# Patient Record
Sex: FEMALE | Race: WHITE
[De-identification: names, ages, dates, MRNs, and addresses within clinical notes are randomized per-mention and may not be internally consistent; named-entity substitution may affect disease eponyms.]

---

## 2018-01-02 ENCOUNTER — HOSPITAL ENCOUNTER (EMERGENCY)
Dept: HOSPITAL 17 - NEPE | Age: 23
LOS: 1 days | Discharge: HOME | End: 2018-01-03
Payer: COMMERCIAL

## 2018-01-02 VITALS
DIASTOLIC BLOOD PRESSURE: 85 MMHG | HEART RATE: 116 BPM | RESPIRATION RATE: 16 BRPM | TEMPERATURE: 98.7 F | SYSTOLIC BLOOD PRESSURE: 127 MMHG | OXYGEN SATURATION: 100 %

## 2018-01-02 VITALS — HEIGHT: 62 IN | BODY MASS INDEX: 20.28 KG/M2 | WEIGHT: 110.23 LBS

## 2018-01-02 DIAGNOSIS — L30.9: Primary | ICD-10-CM

## 2018-01-02 PROCEDURE — 87040 BLOOD CULTURE FOR BACTERIA: CPT

## 2018-01-02 PROCEDURE — 85025 COMPLETE CBC W/AUTO DIFF WBC: CPT

## 2018-01-02 PROCEDURE — 96375 TX/PRO/DX INJ NEW DRUG ADDON: CPT

## 2018-01-02 PROCEDURE — 99284 EMERGENCY DEPT VISIT MOD MDM: CPT

## 2018-01-02 PROCEDURE — 96374 THER/PROPH/DIAG INJ IV PUSH: CPT

## 2018-01-03 LAB
BASOPHILS # BLD AUTO: 0.1 TH/MM3 (ref 0–0.2)
BASOPHILS NFR BLD: 0.5 % (ref 0–2)
EOSINOPHIL # BLD: 0.1 TH/MM3 (ref 0–0.4)
EOSINOPHIL NFR BLD: 0.7 % (ref 0–4)
ERYTHROCYTE [DISTWIDTH] IN BLOOD BY AUTOMATED COUNT: 13.1 % (ref 11.6–17.2)
HCT VFR BLD CALC: 38.1 % (ref 35–46)
HGB BLD-MCNC: 13.1 GM/DL (ref 11.6–15.3)
LYMPHOCYTES # BLD AUTO: 2 TH/MM3 (ref 1–4.8)
LYMPHOCYTES NFR BLD AUTO: 18 % (ref 9–44)
MCH RBC QN AUTO: 30.7 PG (ref 27–34)
MCHC RBC AUTO-ENTMCNC: 34.3 % (ref 32–36)
MCV RBC AUTO: 89.6 FL (ref 80–100)
MONOCYTE #: 0.9 TH/MM3 (ref 0–0.9)
MONOCYTES NFR BLD: 8.5 % (ref 0–8)
NEUTROPHILS # BLD AUTO: 8 TH/MM3 (ref 1.8–7.7)
NEUTROPHILS NFR BLD AUTO: 72.3 % (ref 16–70)
PLATELET # BLD: 251 TH/MM3 (ref 150–450)
PMV BLD AUTO: 8.6 FL (ref 7–11)
RBC # BLD AUTO: 4.26 MIL/MM3 (ref 4–5.3)
WBC # BLD AUTO: 11.1 TH/MM3 (ref 4–11)

## 2018-01-03 NOTE — PD
HPI


Chief Complaint:  Skin Problem


Time Seen by Provider:  23:38


Travel History


International Travel<30 days:  No


Contact w/Intl Traveler<30days:  No


Traveled to known affect area:  No





History of Present Illness


HPI


22-year-old female presents today with urticarial diffuse rash to her left arm.

  The patient states that she was seen in urgent care and they diagnosed this 

as hives.  She states that it started yesterday and has progressed and gotten 

more H he.  She denies a fevers, chills.  She denies any pain under her arm.  

She reports it started yesterday as a 4 x 4 circular red urticarial hive.  

There were vesicles noted on top she states since then they first.  She denies 

any abnormal contact.  He does report that it's gotten bigger.  She reports 

that it still is itchy.  She states that it moved from her forearm up to her 

upper arm.  The patient reports that she is in a study where she is using a 

cream on her hands.  She states that she has not put the cream over her forearm 

where the irritation is.  She denies any shortness of breath or difficulty 

swallowing.  There are no pulmonary symptoms at all.  There are no new vesicles 

noted.





PFSH


Past Medical History


ADHD:  No


Asthma:  Yes


Autoimmune Disease:  No


Blood Disorders:  No


Bipolar Disorder:  Yes


Birth Weight (Kg):  3


Anxiety:  Yes


Depression:  Yes


Cancer:  No


Cardiovascular Problems:  No


Diabetes:  No


Diminished Hearing:  No


Genitourinary:  No


Headaches:  No


Musculoskeletal:  No


Neurologic:  No


Psychiatric:  Yes (Depression, Bipolar, PTSD)


Respiratory:  No


Migraines:  No


Seizures:  No


Sickle Cell Disease:  No


Thyroid Disease:  No


Ulcer:  No


Pregnant?:  Not Pregnant


LMP:  





Past Surgical History


 Section:  No


Other Surgery:  Yes (COLONOSCOPY)





Social History


Alcohol Use:  Yes (OCCASIONALLY)


Tobacco Use:  No


Substance Use:  Yes (MARIJUANA)





Allergies-Medications


(Allergen,Severity, Reaction):  


Coded Allergies:  


     No Known Allergies (Verified  Allergy, Severe, 1/3/18)


Reported Meds & Prescriptions





Reported Meds & Active Scripts


Active


Reported


Clobetasol Topical (Clobetasol Propionate) 0.05% Foam 1 Applic TOPICAL BID








Review of Systems


Except as stated in HPI:  all other systems reviewed are Neg


General / Constitutional:  No: Fever


HENT:  No: Headaches, Neck Stiffness, Neck Pain


Cardiovascular:  No: Chest Pain or Discomfort


Respiratory:  No: Cough, Shortness of Breath, Wheezing


Gastrointestinal:  No: Nausea, Vomiting, Diarrhea, Abdominal Pain


Genitourinary:  No: Frequency, Dysuria


Musculoskeletal:  Positive: Edema, No: Weakness, Pain


Skin:  Positive Rash (vesicles over the left forearm), Positive Itching, 

Positive Lesions


Neurologic:  No: Weakness, Dizziness, Headache, Change in Mentation





Physical Exam


Narrative


GENERAL: Well-nourished, well-developed patient, in no acute respiratory 

distress atraumatic.


SKIN: Focused skin assessment warm/dry.


HEAD: Normocephalic.


EYES: No scleral icterus. No injection or drainage. 


NECK: Supple, trachea midline. No JVD or lymphadenopathy.


CARDIOVASCULAR: Regular rate and rhythm without murmurs, gallops, or rubs. 


RESPIRATORY: Breath sounds equal bilaterally. No accessory muscle use.


GASTROINTESTINAL: Abdomen soft, non-tender, nondistended. 


MUSCULOSKELETAL: Gemmation patient's left arm, she has a 8 x 4" red area with 

central vesicles.  They appear to be water blisters.  There is no purulent 

drainage.  There are excoriations from where the patient has scratched.  She 

also has a satellite lesion in her right biceps area with no vesicles but just 

redness.  She states that it is itchy.  There is no lymphadenopathy.  There is 

no lymphangitis.  This does not have a cellulitic appearance at this time.


NEUROLOGICAL: Awake and alert. Cranial nerves II through XII intact.  Motor 

grossly within normal limits. Five out of 5 muscle strength in all muscle 

groups.  Normal speech.





Data


Data


Last Documented VS





Vital Signs








  Date Time  Temp Pulse Resp B/P (MAP) Pulse Ox O2 Delivery O2 Flow Rate FiO2


 


18 23:00 98.7 116 16 127/85 (99) 100   








Orders





 Orders


Complete Blood Count With Diff (18 23:53)


Blood Culture (18 23:53)


Iv Access Insert/Monitor (18 23:53)


Methylprednisolone So Succ Inj (Solumedr (1/3/18 00:00)


Diphenhydramine Inj (Benadryl Inj) (1/3/18 00:00)





Labs





Laboratory Tests








Test


  1/3/18


00:15


 


White Blood Count 11.1 TH/MM3 


 


Red Blood Count 4.26 MIL/MM3 


 


Hemoglobin 13.1 GM/DL 


 


Hematocrit 38.1 % 


 


Mean Corpuscular Volume 89.6 FL 


 


Mean Corpuscular Hemoglobin 30.7 PG 


 


Mean Corpuscular Hemoglobin


Concent 34.3 % 


 


 


Red Cell Distribution Width 13.1 % 


 


Platelet Count 251 TH/MM3 


 


Mean Platelet Volume 8.6 FL 


 


Neutrophils (%) (Auto) 72.3 % 


 


Lymphocytes (%) (Auto) 18.0 % 


 


Monocytes (%) (Auto) 8.5 % 


 


Eosinophils (%) (Auto) 0.7 % 


 


Basophils (%) (Auto) 0.5 % 


 


Neutrophils # (Auto) 8.0 TH/MM3 


 


Lymphocytes # (Auto) 2.0 TH/MM3 


 


Monocytes # (Auto) 0.9 TH/MM3 


 


Eosinophils # (Auto) 0.1 TH/MM3 


 


Basophils # (Auto) 0.1 TH/MM3 


 


CBC Comment DIFF FINAL 


 


Differential Comment  











MDM


Medical Decision Making


Medical Screen Exam Complete:  Yes


Emergency Medical Condition:  Yes


Differential Diagnosis


Allergic dermatitis versus zoster versus cellulitis


Narrative Course


3-year-old female presents today with complaints of urticarial red rash to the 

left upper extremity.  She states it started with clear color vesicles.  She 

states it became itchy and enlarged.  She denies any fevers, chills.  She 

denies any pain.  She ports having chickenpox as a child.  There is no 

suspicious contact or new products.  She is on a medication trial where she is 

using a hand cream.  She states that the cream has not touched her arm.  It 

appears to be allergic.  It appears to have a appearance of a dermatitis he 

would see with poison oak or poison ivy.  She denies any contact with plant 

products.  She's been given 125 mg of Solu-Medrol and 50 mg of IV Benadryl.  She

'll be discharged with a prescription for prednisone.  It'll start a 60 mg 

daily for 4 days followed by 40 mg daily for 4 days followed by 20 mg daily for 

4 days.  She is instructed to call the medication trial and tell them about the 

rash as I do not think she should use the lotion.  She is also instructed to 

return if she does any fevers, increased blistering, increased rash, or any 

other reason the concerns her.  Recommendation will be that she have a rash 

check in 24 hours.





Diagnosis





 Primary Impression:  


 left upper extremity dermatitis.





***Additional Instructions:  


Benadryl 50 mg every 8 hours 5 days.  Rash check in 24 hours.  Return if fevers

, chills, worse blistering, or any other reason the concerns her.


***Med/Other Pt SpecificInfo:  Prescription(s) given


Scripts


Prednisone (Prednisone) 20 Mg Tab


20 MG PO AS DIRECTED, #24 TAB 0 Refills


   Take 60 MG daily x 4 days, then 40 MG x 4 days, then 20 MG daily x 4


   days.


   Prov: Kodak Esparza MD         1/3/18


Disposition:  01 DISCHARGE HOME


Condition:  Stable











Kodak Esparza MD Eric 3, 2018 00:09

## 2018-01-04 ENCOUNTER — HOSPITAL ENCOUNTER (EMERGENCY)
Dept: HOSPITAL 17 - NEPD | Age: 23
Discharge: LEFT BEFORE DISCHARGE | End: 2018-01-04
Payer: COMMERCIAL

## 2018-01-04 VITALS
RESPIRATION RATE: 18 BRPM | SYSTOLIC BLOOD PRESSURE: 117 MMHG | DIASTOLIC BLOOD PRESSURE: 63 MMHG | HEART RATE: 84 BPM | TEMPERATURE: 98.4 F | OXYGEN SATURATION: 100 %

## 2018-01-04 VITALS — BODY MASS INDEX: 20.28 KG/M2 | WEIGHT: 110.23 LBS | HEIGHT: 62 IN

## 2018-01-04 DIAGNOSIS — Z53.21: Primary | ICD-10-CM

## 2018-01-04 PROCEDURE — 99281 EMR DPT VST MAYX REQ PHY/QHP: CPT
